# Patient Record
Sex: MALE | Race: BLACK OR AFRICAN AMERICAN | Employment: FULL TIME | URBAN - METROPOLITAN AREA
[De-identification: names, ages, dates, MRNs, and addresses within clinical notes are randomized per-mention and may not be internally consistent; named-entity substitution may affect disease eponyms.]

---

## 2023-06-06 ENCOUNTER — OFFICE VISIT (OUTPATIENT)
Dept: FAMILY MEDICINE | Facility: CLINIC | Age: 33
End: 2023-06-06

## 2023-06-06 VITALS
DIASTOLIC BLOOD PRESSURE: 92 MMHG | TEMPERATURE: 97.9 F | OXYGEN SATURATION: 98 % | HEART RATE: 66 BPM | WEIGHT: 220 LBS | RESPIRATION RATE: 16 BRPM | SYSTOLIC BLOOD PRESSURE: 145 MMHG

## 2023-06-06 DIAGNOSIS — S29.012A STRAIN OF RHOMBOID MUSCLE, INITIAL ENCOUNTER: Primary | ICD-10-CM

## 2023-06-06 PROCEDURE — 99203 OFFICE O/P NEW LOW 30 MIN: CPT | Performed by: FAMILY MEDICINE

## 2023-06-06 RX ORDER — CYCLOBENZAPRINE HCL 5 MG
5 TABLET ORAL 3 TIMES DAILY PRN
Qty: 21 TABLET | Refills: 0 | Status: SHIPPED | OUTPATIENT
Start: 2023-06-06

## 2023-06-06 NOTE — PATIENT INSTRUCTIONS
Naproxen (aleve) two tablets twice a day for 3-5 days.  Tylenol 1000mg up to four times a day.    Ice to the sore spot.    Massage the area.    Cyclobenzapril as needed for muscle spasm--it can make you drowsy.    UPPER BACK EXERCISES     Shoulder and Upper Back Stretch  To start, stand tall with your ears, shoulders, and hips in line. Your feet should be slightly apart, positioned just under your hips. Focus your eyes directly in front of you.  this position for a few seconds before starting your exercise. This helps increase your awareness of proper posture.  Reach overhead and slightly back with both arms. Keep your shoulders and neck aligned and your elbows behind your shoulders.  With your palms facing the ceiling, turn your fingers inward.  Take a deep breath. Breathe out and lower your elbows toward your buttocks. Hold for 5 seconds, then return to starting position.  Repeat 3 times.           Shoulder Girdle Stretch      To start, sit in a chair with your feet flat on the floor. Your weight should be slightly forward so that you re balanced evenly on your buttocks. Relax your shoulders and keep your head level. Using a chair with arms may help you keep your balance.  Place one hand on the outside elbow of the other arm.  Pull the arm across your body. Hold for 30-60 seconds. Repeat once.  Switch sides.        Shoulder Shrug Exercise  To start, sit in a chair with your feet flat on the floor. Shift your weight slightly forward to avoid rounding your back. Relax. Keep your ears, shoulders, and hips aligned.  Raise both of your shoulders as high as you can, as if you were trying to touch them to your ears. Keep your head and neck still and relaxed.  Hold for a count of 10. Release.  Repeat 5 times.        Shoulder Squeeze Exercise      To start, sit in a chair with your feet flat on the floor. Shift your weight slightly forward to avoid rounding your back. Relax. Keep your ears, shoulders, and hips  aligned.  Raise your arms to shoulder height, elbows bent and palms forward.  Move your arms back, squeezing your shoulder blades together.  Hold for 10 seconds. Return to starting position.   Repeat 5 times.

## 2023-06-06 NOTE — PROGRESS NOTES
"OUTPATIENT VISIT NOTE                                                   Date of Visit: 6/6/2023     Chief Complaint   Patient presents with:  Back Pain: Mid to upper back dull stabbing x 2 day. Some pain when breathing.            History of Present Illness   Cuauhtemoc Villa is a 33 year old male with girlfriend c/o upper middle back for the last two days.  Started after he got up one morning.  Had a friend \"semiadjust\" it yesterday and it felt better.  Never had before.  Works as -does carrying.  Plays softball, basketball.    Using naprosyn four tablets one time.       MEDICATIONS   No current outpatient medications on file.     No current facility-administered medications for this visit.         SOCIAL HISTORY   Social History     Tobacco Use     Smoking status: Never     Smokeless tobacco: Never   Vaping Use     Vaping status: Not on file   Substance Use Topics     Alcohol use: Not on file           Physical Exam   Vitals:    06/06/23 1822   BP: (!) 145/92   Pulse: 66   Resp: 16   Temp: 97.9  F (36.6  C)   TempSrc: Oral   SpO2: 98%   Weight: 99.8 kg (220 lb)        GEN:  NAD  LUNGS:  Clear to auscultation without wheezing.  Normal effort.  HEART:  RRR without murmur, rub or gallop   BACK; Tender over left rhomboids.  No spinal tenderness         Assessment and Plan     Strain of rhomboid muscle, initial encounter  Patient Instructions     Naproxen (aleve) two tablets twice a day for 3-5 days.  Tylenol 1000mg up to four times a day.    Ice to the sore spot.    Massage the area.    Cyclobenzapril as needed for muscle spasm--it can make you drowsy.                                   Discussed signs / symptoms that warrant urgent / emergent medical attention.     Recheck if worsening or not improving.       Sy Hansen MD          Pertinent History     The following portions of the patient's history were reviewed and updated as appropriate: allergies, current medications, past family history, past " medical history, past social history, past surgical history and problem list.